# Patient Record
Sex: FEMALE | Race: WHITE | NOT HISPANIC OR LATINO | Employment: FULL TIME | URBAN - METROPOLITAN AREA
[De-identification: names, ages, dates, MRNs, and addresses within clinical notes are randomized per-mention and may not be internally consistent; named-entity substitution may affect disease eponyms.]

---

## 2023-04-10 LAB
ABO GROUP (TYPE) IN BLOOD: NORMAL
ANTIBODY SCREEN: NORMAL
GLUCOSE, 1 HR SCREEN, PREG: 147 MG/DL
HEMATOCRIT (%) IN BLOOD BY AUTOMATED COUNT: 33.5 % (ref 36–46)
HEMOGLOBIN (G/DL) IN BLOOD: 10.5 G/DL (ref 12–16)
HEPATITIS B VIRUS SURFACE AB (MIU/ML) IN SERUM: <3.1 MIU/ML
RH FACTOR: NORMAL

## 2023-04-14 LAB
GLUCOSE THREE HOUR: 117 MG/DL
GLUCOSE TWO HOUR: 130 MG/DL
GLUCOSE, FASTING: 79 MG/DL
GLUCOSE, ONE HOUR: 113 MG/DL
GTTCM: NORMAL

## 2023-06-24 LAB — GROUP B STREP SCREEN: ABNORMAL

## 2023-07-12 LAB
ABO GROUP (TYPE) IN BLOOD: NORMAL
ABO GROUP (TYPE) IN BLOOD: NORMAL
ANTIBODY SCREEN: NORMAL
ANTIBODY SCREEN: NORMAL
ERYTHROCYTE DISTRIBUTION WIDTH (RATIO) BY AUTOMATED COUNT: 14.7 % (ref 11.5–14.5)
ERYTHROCYTE MEAN CORPUSCULAR HEMOGLOBIN CONCENTRATION (G/DL) BY AUTOMATED: 34.7 G/DL (ref 32–36)
ERYTHROCYTE MEAN CORPUSCULAR VOLUME (FL) BY AUTOMATED COUNT: 88 FL (ref 80–100)
ERYTHROCYTES (10*6/UL) IN BLOOD BY AUTOMATED COUNT: 3.94 X10E12/L (ref 4–5.2)
HEMATOCRIT (%) IN BLOOD BY AUTOMATED COUNT: 34.6 % (ref 36–46)
HEMOGLOBIN (G/DL) IN BLOOD: 12 G/DL (ref 12–16)
LEUKOCYTES (10*3/UL) IN BLOOD BY AUTOMATED COUNT: 7.4 X10E9/L (ref 4.4–11.3)
PLATELETS (10*3/UL) IN BLOOD AUTOMATED COUNT: 135 X10E9/L (ref 150–450)
RH FACTOR: NORMAL
RH FACTOR: NORMAL

## 2023-09-29 PROBLEM — N97.9 FEMALE INFERTILITY: Status: ACTIVE | Noted: 2023-09-29

## 2023-09-29 PROBLEM — N91.2 AMENORRHEA: Status: ACTIVE | Noted: 2023-09-29

## 2023-09-29 PROBLEM — B97.7 HPV IN FEMALE: Status: ACTIVE | Noted: 2023-09-29

## 2023-09-29 PROBLEM — E66.01 CLASS 3 SEVERE OBESITY DUE TO EXCESS CALORIES WITH BODY MASS INDEX (BMI) OF 45.0 TO 49.9 IN ADULT (MULTI): Status: ACTIVE | Noted: 2023-09-29

## 2023-09-29 PROBLEM — R87.810 ASCUS WITH POSITIVE HIGH RISK HPV CERVICAL: Status: ACTIVE | Noted: 2023-09-29

## 2023-09-29 PROBLEM — Z67.91 BLOOD TYPE, RH NEGATIVE: Status: ACTIVE | Noted: 2023-09-29

## 2023-09-29 PROBLEM — E66.813 CLASS 3 SEVERE OBESITY DUE TO EXCESS CALORIES WITH BODY MASS INDEX (BMI) OF 45.0 TO 49.9 IN ADULT: Status: ACTIVE | Noted: 2023-09-29

## 2023-09-29 PROBLEM — Z87.59 HISTORY OF SPONTANEOUS ABORTION: Status: ACTIVE | Noted: 2023-09-29

## 2023-09-29 PROBLEM — R87.610 ASCUS WITH POSITIVE HIGH RISK HPV CERVICAL: Status: ACTIVE | Noted: 2023-09-29

## 2023-09-29 RX ORDER — SERTRALINE HYDROCHLORIDE 25 MG/1
TABLET, FILM COATED ORAL
COMMUNITY

## 2023-09-29 RX ORDER — LEVONORGESTREL 52 MG/1
1 INTRAUTERINE DEVICE INTRAUTERINE ONCE
COMMUNITY
Start: 2023-08-18 | End: 2030-08-19

## 2023-09-29 RX ORDER — SERTRALINE HYDROCHLORIDE 50 MG/1
1 TABLET, FILM COATED ORAL DAILY
COMMUNITY

## 2023-09-29 RX ORDER — PNV NO.95/FERROUS FUM/FOLIC AC 28MG-0.8MG
TABLET ORAL
COMMUNITY
Start: 2022-06-07

## 2023-09-29 RX ORDER — CLOMIPHENE CITRATE 50 MG/1
2 TABLET ORAL DAILY
COMMUNITY
Start: 2022-08-16

## 2023-10-02 ENCOUNTER — ROUTINE PRENATAL (OUTPATIENT)
Dept: OBSTETRICS AND GYNECOLOGY | Facility: CLINIC | Age: 38
End: 2023-10-02
Payer: COMMERCIAL

## 2023-10-02 DIAGNOSIS — Z01.419 WELL WOMAN EXAM WITH ROUTINE GYNECOLOGICAL EXAM: Primary | ICD-10-CM

## 2023-10-02 DIAGNOSIS — Z12.31 VISIT FOR SCREENING MAMMOGRAM: ICD-10-CM

## 2023-10-02 DIAGNOSIS — N76.0 BACTERIAL VAGINOSIS: ICD-10-CM

## 2023-10-02 DIAGNOSIS — B96.89 BACTERIAL VAGINOSIS: ICD-10-CM

## 2023-10-02 PROCEDURE — 88142 CYTOPATH C/V THIN LAYER: CPT

## 2023-10-02 PROCEDURE — 87624 HPV HI-RISK TYP POOLED RSLT: CPT

## 2023-10-02 PROCEDURE — 99396 PREV VISIT EST AGE 40-64: CPT | Performed by: OBSTETRICS & GYNECOLOGY

## 2023-10-02 RX ORDER — BUSPIRONE HYDROCHLORIDE 7.5 MG/1
7.5 TABLET ORAL 2 TIMES DAILY
COMMUNITY
Start: 2023-08-08 | End: 2023-11-06

## 2023-10-02 NOTE — PROGRESS NOTES
Ingrid Le is a 38 y.o. female who is here for a routine exam. Periods are  irregular since her IUD insertion last month , is bleeding daily. dysmenorrhea:none. Cyclic symptoms include none. No intermenstrual bleeding, spotting, or discharge.    Current contraception: IUD  Regular self breast exam: yes  History of abnormal mammogram: no  Family history of breast cancer: no  Menstrual History:  OB History    No obstetric history on file.        No LMP recorded.         Review of Systems  All Normal Review of Systems  Constitutional: no fever, no chills, no recent weight gain, no recent weight loss and no fatigue.    Gastrointestinal: no abdominal pain, no constipation, no nausea, no diarrhea and no vomiting.    Genitourinary: no dysuria, no urinary incontinence, no vaginal dryness, no vaginal itching, no dyspareunia, no pelvic pain, no dysmenorrhea, no sexual problems, no change in urinary frequency, no vaginal discharge, no unexplained vaginal bleeding and no lesion/sore.    Breasts: No masses.  No nipple discharge.  No redness    Objective   There were no vitals taken for this visit.    Physical Exam   Physical Exam  Constitutional: Alert and in no acute distress. Well developed, well nourished.   Head and Face: Head and face: Normal.    Eyes: Normal external exam - nonicteric sclera, extraocular movements intact (EOMI) and no ptosis.   Ears, Nose, Mouth, and Throat: External inspection of ears and nose: Normal.    Neck: No neck asymmetry. Supple. Thyroid not enlarged and there were no palpable thyroid nodules.   Chest: Breasts: Normal appearance, no nipple discharge and no skin changes. Palpation of breasts and axillae: No palpable mass and no axillary lymphadenopathy.   Abdomen: Soft nontender; no abdominal mass palpated. No organomegaly. No hernias.     Genitourinary:   External genitalia: Normal. No inguinal lymphadenopathy. Bartholin's Urethral and Skenes Glands: Normal. Urethra: Normal.    Bladder:  Normal on palpation.   Vagina: Normal. No discharge  Cervix: Normal.    Uterus: Normal.    Right Adnexa/parametria: Normal.  Left Adnexa/parametria: Normal.    Inspection of Perianal Area: Normal.     Musculoskeletal: No joint swelling seen, normal movements of all extremities.   Skin: Normal skin color and pigmentation, normal skin turgor, and no rash.   Neurologic: Non-focal. Grossly intact.   Psychiatric: Alert and oriented x 3. Affect normal to patient baseline. Mood: Appropriate.      Assessment/Plan   1: Annual exam  Pap with HPV testing completed.  Mammogram ordered    2: IUD surveillance.  IUD in place on exam.  Is good through August 2030.  We will check yearly at her annual    Thank you for your visit.  We will see you again next year for your annual exam or as needed

## 2023-10-20 ENCOUNTER — ANCILLARY PROCEDURE (OUTPATIENT)
Dept: RADIOLOGY | Facility: CLINIC | Age: 38
End: 2023-10-20
Payer: COMMERCIAL

## 2023-10-20 VITALS — HEIGHT: 66 IN | WEIGHT: 255.73 LBS | BODY MASS INDEX: 41.1 KG/M2

## 2023-10-20 DIAGNOSIS — Z12.31 VISIT FOR SCREENING MAMMOGRAM: ICD-10-CM

## 2023-10-20 PROCEDURE — 77067 SCR MAMMO BI INCL CAD: CPT | Mod: BILATERAL PROCEDURE | Performed by: RADIOLOGY

## 2023-10-20 PROCEDURE — 77067 SCR MAMMO BI INCL CAD: CPT | Mod: 50

## 2023-10-20 PROCEDURE — 77063 BREAST TOMOSYNTHESIS BI: CPT | Mod: BILATERAL PROCEDURE | Performed by: RADIOLOGY

## 2023-10-21 LAB
CYTOLOGY CMNT CVX/VAG CYTO-IMP: NORMAL
HPV HR GENOTYPES PNL CVX NAA+PROBE: NEGATIVE
HPV HR GENOTYPES PNL CVX NAA+PROBE: NEGATIVE
HPV16 DNA SPEC QL NAA+PROBE: NEGATIVE
HPV18 DNA SPEC QL NAA+PROBE: NEGATIVE
LAB AP HPV GENOTYPE QUESTION: YES
LAB AP HPV HR: NORMAL
LABORATORY COMMENT REPORT: NORMAL
LABORATORY COMMENT REPORT: NORMAL
PATH REPORT.TOTAL CANCER: NORMAL

## 2023-10-23 RX ORDER — METRONIDAZOLE 7.5 MG/G
GEL VAGINAL DAILY
Qty: 70 G | Refills: 0 | Status: SHIPPED | OUTPATIENT
Start: 2023-10-23 | End: 2023-10-28

## 2024-04-18 ENCOUNTER — HOSPITAL ENCOUNTER (OUTPATIENT)
Dept: RADIOLOGY | Facility: CLINIC | Age: 39
Discharge: HOME | End: 2024-04-18
Payer: COMMERCIAL

## 2024-04-18 DIAGNOSIS — M25.552 PAIN IN LEFT HIP: ICD-10-CM

## 2024-04-18 PROCEDURE — 73502 X-RAY EXAM HIP UNI 2-3 VIEWS: CPT | Mod: LEFT SIDE | Performed by: RADIOLOGY

## 2024-04-18 PROCEDURE — 73502 X-RAY EXAM HIP UNI 2-3 VIEWS: CPT | Mod: LT

## 2024-06-13 ENCOUNTER — HOSPITAL ENCOUNTER (EMERGENCY)
Facility: HOSPITAL | Age: 39
Discharge: HOME | End: 2024-06-13
Attending: EMERGENCY MEDICINE
Payer: COMMERCIAL

## 2024-06-13 VITALS
RESPIRATION RATE: 16 BRPM | DIASTOLIC BLOOD PRESSURE: 75 MMHG | WEIGHT: 240 LBS | SYSTOLIC BLOOD PRESSURE: 113 MMHG | TEMPERATURE: 97.3 F | BODY MASS INDEX: 38.57 KG/M2 | HEART RATE: 74 BPM | HEIGHT: 66 IN | OXYGEN SATURATION: 100 %
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
## 2024-06-13 LAB
ALBUMIN SERPL BCP-MCNC: 4.8 G/DL (ref 3.4–5)
ALP SERPL-CCNC: 89 U/L (ref 33–110)
ALT SERPL W P-5'-P-CCNC: 34 U/L (ref 7–45)
ANION GAP SERPL CALC-SCNC: 14 MMOL/L (ref 10–20)
AST SERPL W P-5'-P-CCNC: 20 U/L (ref 9–39)
B-HCG SERPL-ACNC: <2 MIU/ML
BASOPHILS # BLD AUTO: 0.04 X10*3/UL (ref 0–0.1)
BASOPHILS NFR BLD AUTO: 0.4 %
BILIRUB SERPL-MCNC: 0.7 MG/DL (ref 0–1.2)
BUN SERPL-MCNC: 19 MG/DL (ref 6–23)
CALCIUM SERPL-MCNC: 9.2 MG/DL (ref 8.6–10.3)
CHLORIDE SERPL-SCNC: 101 MMOL/L (ref 98–107)
CO2 SERPL-SCNC: 26 MMOL/L (ref 21–32)
CREAT SERPL-MCNC: 0.79 MG/DL (ref 0.5–1.05)
EGFRCR SERPLBLD CKD-EPI 2021: >90 ML/MIN/1.73M*2
EOSINOPHIL # BLD AUTO: 0.21 X10*3/UL (ref 0–0.7)
EOSINOPHIL NFR BLD AUTO: 2.3 %
ERYTHROCYTE [DISTWIDTH] IN BLOOD BY AUTOMATED COUNT: 13.4 % (ref 11.5–14.5)
GLUCOSE SERPL-MCNC: 97 MG/DL (ref 74–99)
HCT VFR BLD AUTO: 41 % (ref 36–46)
HGB BLD-MCNC: 14.1 G/DL (ref 12–16)
IMM GRANULOCYTES # BLD AUTO: 0.03 X10*3/UL (ref 0–0.7)
IMM GRANULOCYTES NFR BLD AUTO: 0.3 % (ref 0–0.9)
LYMPHOCYTES # BLD AUTO: 2.45 X10*3/UL (ref 1.2–4.8)
LYMPHOCYTES NFR BLD AUTO: 26.4 %
MCH RBC QN AUTO: 30.5 PG (ref 26–34)
MCHC RBC AUTO-ENTMCNC: 34.4 G/DL (ref 32–36)
MCV RBC AUTO: 89 FL (ref 80–100)
MONOCYTES # BLD AUTO: 0.47 X10*3/UL (ref 0.1–1)
MONOCYTES NFR BLD AUTO: 5.1 %
NEUTROPHILS # BLD AUTO: 6.09 X10*3/UL (ref 1.2–7.7)
NEUTROPHILS NFR BLD AUTO: 65.5 %
NRBC BLD-RTO: 0 /100 WBCS (ref 0–0)
PLATELET # BLD AUTO: 191 X10*3/UL (ref 150–450)
POTASSIUM SERPL-SCNC: 3.7 MMOL/L (ref 3.5–5.3)
PROT SERPL-MCNC: 8.3 G/DL (ref 6.4–8.2)
RBC # BLD AUTO: 4.62 X10*6/UL (ref 4–5.2)
SODIUM SERPL-SCNC: 137 MMOL/L (ref 136–145)
WBC # BLD AUTO: 9.3 X10*3/UL (ref 4.4–11.3)

## 2024-06-13 PROCEDURE — 90471 IMMUNIZATION ADMIN: CPT

## 2024-06-13 PROCEDURE — 87340 HEPATITIS B SURFACE AG IA: CPT | Mod: AHULAB

## 2024-06-13 PROCEDURE — 90715 TDAP VACCINE 7 YRS/> IM: CPT

## 2024-06-13 PROCEDURE — 99283 EMERGENCY DEPT VISIT LOW MDM: CPT | Mod: 25

## 2024-06-13 PROCEDURE — 87389 HIV-1 AG W/HIV-1&-2 AB AG IA: CPT | Mod: AHULAB

## 2024-06-13 PROCEDURE — 85025 COMPLETE CBC W/AUTO DIFF WBC: CPT

## 2024-06-13 PROCEDURE — 80053 COMPREHEN METABOLIC PANEL: CPT

## 2024-06-13 PROCEDURE — 2500000001 HC RX 250 WO HCPCS SELF ADMINISTERED DRUGS (ALT 637 FOR MEDICARE OP)

## 2024-06-13 PROCEDURE — 36415 COLL VENOUS BLD VENIPUNCTURE: CPT

## 2024-06-13 PROCEDURE — 84702 CHORIONIC GONADOTROPIN TEST: CPT

## 2024-06-13 PROCEDURE — 2500000004 HC RX 250 GENERAL PHARMACY W/ HCPCS (ALT 636 FOR OP/ED)

## 2024-06-13 PROCEDURE — 86706 HEP B SURFACE ANTIBODY: CPT | Mod: AHULAB

## 2024-06-13 PROCEDURE — 86803 HEPATITIS C AB TEST: CPT | Mod: AHULAB

## 2024-06-13 RX ORDER — EMTRICITABINE AND TENOFOVIR DISOPROXIL FUMARATE 200; 300 MG/1; MG/1
1 TABLET, FILM COATED ORAL ONCE
Status: COMPLETED | OUTPATIENT
Start: 2024-06-13 | End: 2024-06-13

## 2024-06-13 RX ORDER — EMTRICITABINE AND TENOFOVIR DISOPROXIL FUMARATE 200; 300 MG/1; MG/1
1 TABLET, FILM COATED ORAL DAILY
Qty: 27 TABLET | Refills: 0 | Status: SHIPPED | OUTPATIENT
Start: 2024-06-13 | End: 2024-07-10

## 2024-06-13 RX ADMIN — EMTRICITABINE AND TENOFOVIR DISOPROXIL FUMARATE 1 TABLET: 200; 300 TABLET, FILM COATED ORAL at 19:35

## 2024-06-13 RX ADMIN — DOLUTEGRAVIR SODIUM 50 MG: 50 TABLET, FILM COATED ORAL at 19:35

## 2024-06-13 RX ADMIN — TETANUS TOXOID, REDUCED DIPHTHERIA TOXOID AND ACELLULAR PERTUSSIS VACCINE, ADSORBED 0.5 ML: 5; 2.5; 8; 8; 2.5 SUSPENSION INTRAMUSCULAR at 17:56

## 2024-06-13 ASSESSMENT — COLUMBIA-SUICIDE SEVERITY RATING SCALE - C-SSRS
2. HAVE YOU ACTUALLY HAD ANY THOUGHTS OF KILLING YOURSELF?: NO
6. HAVE YOU EVER DONE ANYTHING, STARTED TO DO ANYTHING, OR PREPARED TO DO ANYTHING TO END YOUR LIFE?: NO
1. IN THE PAST MONTH, HAVE YOU WISHED YOU WERE DEAD OR WISHED YOU COULD GO TO SLEEP AND NOT WAKE UP?: NO

## 2024-06-13 NOTE — ED PROVIDER NOTES
HPI   Chief Complaint   Patient presents with    Body Fluid Exposure     Rt thumb needle prick at a [facility] clinic       38-year-old female presents the ED today with a chief concern of fingerstick injury.  Patient states that she stuck her right thumb with a needle.  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  She states that there was a needle pointing up and the needle poked her right thumb.  She states it did start bleeding a little bit.  She cleaned it off at work.  She denies any fever/chills, nausea/vomiting.  Denies numbness or tingling or weakness.  Did not sustain any other injuries.  Not sure when last tetanus vaccination was.  Up-to-date on remainder of immunizations.  Has no other symptoms or concerns this time.      History provided by:  Patient   used: No                        No data recorded                   Patient History   Past Medical History:   Diagnosis Date    14 weeks gestation of pregnancy ([facility]) 05/16/2022    14 weeks gestation of pregnancy    Acute candidiasis of vulva and vagina 02/25/2020    Yeast vaginitis    Acute vaginitis 11/14/2022    Bacterial vaginosis    Atypical squamous cells of undetermined significance on cytologic smear of cervix (ASC-US) 11/03/2022    ASCUS with positive high risk HPV cervical    Encounter for fertility testing 11/01/2021    Fertility testing    Encounter for fertility testing 10/18/2021    Fertility testing    Encounter for other general counseling and advice on procreation 10/18/2021    Encounter for preconception consultation    Encounter for other procreative management 11/29/2021    Encounter for fertility planning    Encounter for other screening for genetic and chromosomal anomalies 12/06/2022    Genetic screening    Encounter for other specified surgical aftercare 06/07/2022    Encounter for post surgical wound check    [facility]    Positive urine pregnancy test    [facility]    Positive blood pregnancy test    [facility]    Positive urine pregnancy test    Encounter for pregnancy test, result unknown 03/10/2022    Unconfirmed pregnancy    Encounter for removal of intrauterine contraceptive device 2021    Encounter for IUD removal    Encounter for routine checking of intrauterine contraceptive device 2021    Surveillance of (intrauterine) contraceptive device    Encounter for screening for infections with a predominantly sexual mode of transmission 2022    Screening for STD (sexually transmitted disease)    Encounter for screening for other suspected endocrine disorder 11/15/2022    Screening for thyroid disorder    Encounter for screening for other suspected endocrine disorder 10/18/2021    Screening for thyroid disorder    Incomplete spontaneous  without complication [facility] 01/10/2022    Incomplete     Localized adiposity 10/26/2021    Localized adiposity of abdomen    Low grade squamous intraepithelial lesion on cytologic smear of cervix ([facility]IL) 2020    Pap smear abnormality of cervix with LGSIL    Maternal care for (suspected) chromosomal abnormality in fetus, trisomy 13, not applicable or unspecified ([facility]) 2022    Trisomy 13 of fetus affecting management of pregnancy    Other specified health status 10/18/2021    Varicella vaccination status unknown    Other specified health status 10/18/2021    Rubella immune status not known    Other specified joint disorders, unspecified hip 2019    Femoral acetabular impingement    Other specified pregnancy related conditions, unspecified trimester ([facility]) 2022    Rh negative, maternal    Other sprain of left hip, subsequent encounter 11/15/2019    Acetabular labrum tear, left, subsequent encounter     Other sprain of right hip, subsequent encounter 2017    Acetabular labrum tear, right, subsequent encounter    Pain in right hip 2017    Right hip pain    Papillomavirus as the cause of diseases classified elsewhere 2022    HPV in female    Personal history of other diseases of the female genital tract 11/15/2022    History of amenorrhea    Personal history of other diseases of the female genital tract 2022    History of irregular menstrual cycles    Personal history of other diseases of the female genital tract 2022    History of irregular menstrual cycles    Personal history of other diseases of the female genital tract 2022    History of irregular menstrual cycles    Personal history of other diseases of the female genital tract 2022    History of irregular menstrual cycles    Personal history of other diseases of the female genital tract 2022    History of female infertility    Personal history of other mental and behavioral disorders 2017    History of depression    Postcoital and contact bleeding 2020    Postcoital bleeding    Pregnancy with inconclusive fetal viability, not applicable or unspecified ([facility])     Pregnancy with inconclusive fetal viability    Supervision of elderly multigravida, first trimester ([facility]) 2022    Supervision of elderly multigravida in first trimester (>=35 years old at time of delivery)     Past Surgical History:   Procedure Laterality Date    OTHER SURGICAL HISTORY  2022    Dilation and evacuation    OTHER SURGICAL HISTORY  10/18/2021    Tonsillectomy with adenoidectomy    OTHER SURGICAL HISTORY  02/10/2020    Sinus surgery    OTHER SURGICAL HISTORY  02/10/2020     section    OTHER SURGICAL HISTORY  02/10/2020    Hip arthroscopy    OTHER SURGICAL HISTORY  02/10/2020    Abdominoplasty     Family History   Problem Relation Name Age of Onset    Other (trisomy 18) Child       Social History      Tobacco Use    Smoking status: Never    Smokeless tobacco: Never   Substance Use Topics    Alcohol use: Never    Drug use: Never       Physical Exam   ED Triage Vitals [06/13/24 1717]   Temperature Heart Rate Respirations BP   36.3 °C (97.3 °F) 89 18 131/85      Pulse Ox Temp src Heart Rate Source Patient Position   99 % -- -- --      BP Location FiO2 (%)     -- --       Physical Exam  Constitutional: Vital signs per nursing notes.  Well developed, well nourished.  No acute distress.    Psychiatric: alert and oriented to person, place, and time; no abnormalities of mood or affect; memory intact  Eyes: PERRL; conjunctivae and lids normal  ENT: Ears normal externally; face symmetric. voice normal  Neck: neck supple, no meningismus; trachea midline without deviation  Respiratory: normal respiratory effort and excursion; no rales, rhonchi, or wheezes; equal air entry  Cardiovascular: RRR, 2+ pulses extremities   Neurological: normal speech; CN II-XII grossly intact, normal motor and sensory function  GI: no distention, soft, nontender  : Deferred  Musculoskeletal: normal gait and station; normal digits and nails; normal to palpation; normal strength/tone; neurovascular status intact.  Skin: Pinpoint prick to right thumb.  No active bleeding.  No surrounding erythema or streaking.    ED Course & MDM   ED Course as of 06/13/24 1926   Thu Jun 13, 2024 1910 Attempted to reach out to Dr. Campos infectious disease []   1926 CBC shows no evidence of leukocytosis or anemia.  CMP shows no acute or acute liver injury.  hCG normal.  HIV, hepatitis B surface antigen, base B surface antibody, hepatitis C antibody pending []      ED Course User Index  [] Jamie Payton PA-C         Diagnoses as of 06/13/24 1926   Needlestick injury accident with exposure to body fluid       Medical Decision Making  38-year-old female presents the ED today with a chief concern of fingerstick injury.  Vital signs reassuring.  Patient  overall appears well and is nontoxic-appearing.  Patient has full range of motion of the neck without any meningismus.  Was given tetanus in the ED. had extensive conversation with patient regarding starting postexposure prophylaxis medication.  Patient would like to be treated with medication at this time.  Labs drawn.  Offered cleaning wound in ED however patient declines and states that she already did this before she came.  Will have patient follow-up with infectious disease.  Discussed impression and findings with patient she feels comfortable returning home.  We discussed very strict return precautions include returning for any new or worsening signs or symptoms.  Patient is in agreement this plan.  She will follow-up with her PCP and infectious disease within 3 days.  Again discussed strict return precautions.  Started on Truvada and Tivicay per  needlestick exposure guidelines.  She was given a dose of these 2 medications in the ED as well.  Referral sent.    Attempted to reach out to Dr. Campos from infectious disease during patient's ED visit.  Unable to reach him.  He responded the next morning with number patient can call to schedule an appointment with their office.  I called patient and gave her this number.    Differential diagnosis: Needlestick injury, abrasion, strain, sprain, blood-borne pathogen    Disposition/treatment  1.  Needlestick injury    Shared decision-making was used patient feels comfortable returning home     Patient was advised to follow up with recommended provider in 1 day1 for another evaluation and exam. I advised patient/guardian to return or go to closest emergency room immediately if symptoms change, get worse, new symptoms develop prior to follow up. If there is no improvement in symptoms in the next 24 hours they are advised to return for further evaluation and exam. I also explained the plan and treatment course. Patient/guardian is in agreement with plan, treatment  course, and follow up and states verbally that they will comply.    Homegoing. I discussed the differential; results and discharge plan with the patient and/or family/friend/caregiver if present.  I emphasized the importance of follow-up with the physician I referred them to in the timeframe recommended.  I explained reasons for the patient to return to the Emergency Department. They agreed that if they feel their condition is worsening or if they have any other concern they should call 911 immediately for further assistance. I gave the patient an opportunity to ask all questions they had and answered all of them accordingly. They understand return precautions and discharge instructions. The patient and/or family/friend/caregiver expressed understanding verbally and that they would comply.        This note has been transcribed using voice recognition and may contain grammatical errors, misplaced words, incorrect words, incorrect phrases or other errors.        Procedure  Procedures     Jamie Payton PA-C  06/13/24 1927       Jamie Payton PA-C  06/14/24 0904       Jamie Payton PA-C  06/14/24 0904       Jamie Payton PA-C  06/14/24 0905

## 2024-06-13 NOTE — DISCHARGE INSTRUCTIONS
Please return to the ED immediately if you have any new or worsening signs or symptoms  Please follow-up with your primary care doctor within 3 days  Please follow-up with infectious disease within 3 days

## 2024-06-14 LAB
HBV SURFACE AB SER-ACNC: <3.1 MIU/ML
HBV SURFACE AG SERPL QL IA: NONREACTIVE
HCV AB SER QL: NONREACTIVE
HIV 1+2 AB+HIV1 P24 AG SERPL QL IA: NONREACTIVE

## 2024-07-10 ENCOUNTER — LAB (OUTPATIENT)
Dept: LAB | Facility: LAB | Age: 39
End: 2024-07-10
Payer: COMMERCIAL
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
## 2024-07-10 LAB
HBV CORE AB SER QL: NONREACTIVE
HCV AB SER QL: NONREACTIVE
HIV 1+2 AB+HIV1 P24 AG SERPL QL IA: NONREACTIVE

## 2024-07-10 PROCEDURE — 87389 HIV-1 AG W/HIV-1&-2 AB AG IA: CPT

## 2024-07-10 PROCEDURE — 86803 HEPATITIS C AB TEST: CPT

## 2024-07-10 PROCEDURE — 86704 HEP B CORE ANTIBODY TOTAL: CPT

## 2024-10-03 ENCOUNTER — APPOINTMENT (OUTPATIENT)
Dept: OBSTETRICS AND GYNECOLOGY | Facility: CLINIC | Age: 39
End: 2024-10-03
Payer: COMMERCIAL

## 2024-12-26 ENCOUNTER — HOSPITAL ENCOUNTER (OUTPATIENT)
Dept: RADIOLOGY | Facility: CLINIC | Age: 39
Discharge: HOME | End: 2024-12-26
Payer: COMMERCIAL

## 2024-12-26 ENCOUNTER — APPOINTMENT (OUTPATIENT)
Dept: OBSTETRICS AND GYNECOLOGY | Facility: CLINIC | Age: 39
End: 2024-12-26
Payer: COMMERCIAL

## 2024-12-26 VITALS
SYSTOLIC BLOOD PRESSURE: 102 MMHG | BODY MASS INDEX: 39.05 KG/M2 | HEIGHT: 66 IN | WEIGHT: 243 LBS | DIASTOLIC BLOOD PRESSURE: 64 MMHG

## 2024-12-26 VITALS — BODY MASS INDEX: 38.62 KG/M2 | HEIGHT: 66 IN | WEIGHT: 240.3 LBS

## 2024-12-26 DIAGNOSIS — N92.6 IRREGULAR MENSTRUAL CYCLE: ICD-10-CM

## 2024-12-26 DIAGNOSIS — Z97.5 BREAKTHROUGH BLEEDING ASSOCIATED WITH INTRAUTERINE DEVICE (IUD): Primary | ICD-10-CM

## 2024-12-26 DIAGNOSIS — N88.2 CERVICAL STENOSIS (UTERINE CERVIX): ICD-10-CM

## 2024-12-26 DIAGNOSIS — N92.1 BREAKTHROUGH BLEEDING ASSOCIATED WITH INTRAUTERINE DEVICE (IUD): Primary | ICD-10-CM

## 2024-12-26 DIAGNOSIS — Z12.31 SCREENING MAMMOGRAM FOR BREAST CANCER: ICD-10-CM

## 2024-12-26 DIAGNOSIS — T83.32XA INTRAUTERINE CONTRACEPTIVE DEVICE THREADS LOST, INITIAL ENCOUNTER: ICD-10-CM

## 2024-12-26 PROCEDURE — 99214 OFFICE O/P EST MOD 30 MIN: CPT | Performed by: OBSTETRICS & GYNECOLOGY

## 2024-12-26 PROCEDURE — 3008F BODY MASS INDEX DOCD: CPT | Performed by: OBSTETRICS & GYNECOLOGY

## 2024-12-26 PROCEDURE — 1036F TOBACCO NON-USER: CPT | Performed by: OBSTETRICS & GYNECOLOGY

## 2024-12-26 PROCEDURE — 76817 TRANSVAGINAL US OBSTETRIC: CPT | Performed by: OBSTETRICS & GYNECOLOGY

## 2024-12-26 PROCEDURE — 77067 SCR MAMMO BI INCL CAD: CPT

## 2024-12-26 RX ORDER — IBUPROFEN 600 MG/1
TABLET ORAL
Qty: 2 TABLET | Refills: 0 | Status: SHIPPED | OUTPATIENT
Start: 2024-12-26

## 2024-12-26 RX ORDER — MISOPROSTOL 200 UG/1
TABLET ORAL
Qty: 2 TABLET | Refills: 0 | Status: SHIPPED | OUTPATIENT
Start: 2024-12-26

## 2024-12-26 ASSESSMENT — PATIENT HEALTH QUESTIONNAIRE - PHQ9
SUM OF ALL RESPONSES TO PHQ9 QUESTIONS 1 AND 2: 0
1. LITTLE INTEREST OR PLEASURE IN DOING THINGS: NOT AT ALL
2. FEELING DOWN, DEPRESSED OR HOPELESS: NOT AT ALL

## 2024-12-26 NOTE — PROGRESS NOTES
"Subjective   Patient ID: Ingrid Le is a 39 y.o. female who presents for Menstrual Problem (Ongoing bleeding after IUD placement  and painful intercourse ).    Patient here for bleeding with her Mirena IUD  Mirena was inserted August 2023  Patient states her bleeding has been daily  In November she had 3 weeks of more heavy bleeding which is now spotting.  This happens every day  She is not interested in her IUD anymore but will keep it in until we decide a plan  Has failed the birth control pills in the past  Would like to have a hysterectomy at this point  Will need to evaluate the lining of the uterus.     IUD strings were not appreciated on exam today.  IUD was evaluated today and is in the proper location based on ultrasound    ROS  All Normal Review of Systems  Constitutional: no fever, no chills, no recent weight gain, no recent weight loss and no fatigue.    Gastrointestinal: no abdominal pain, no constipation, no nausea, no diarrhea and no vomiting.    Genitourinary: no dysuria, no urinary incontinence, no vaginal dryness, no vaginal itching, no dyspareunia, no pelvic pain, no dysmenorrhea, no sexual problems, no change in urinary frequency, no vaginal discharge, no unexplained vaginal bleeding and no lesion/sore.    Breasts: No masses.  No nipple discharge.  No redness    Objective   /64   Ht 1.676 m (5' 6\")   Wt 110 kg (243 lb)   LMP  (LMP Unknown) Comment: Ongoing  BMI 39.22 kg/m²    Physical Exam  General: No distress.  Alert and oriented  Abdomen: Soft, nontender, nondistended  External Genitalia:  no masses.  no erythema.  no discharge noted.  Vagina:  no masses.  no discharge noted.    Cervix: no masses.  IUD strings not visualized or palpated  Uterus:  normal size and contour.  no masses. palpated  Adnexa: R: no masses, non tender.    Adnexa: L: no masses.  non tender  Extremities: No swelling  Psych: No sadness.      Assessment/Plan   1) breakthrough bleeding and irregular bleeding " with her IUD.  Strings not visualized      Has been bleeding since her insertion  IUD was not visualized or palpated on exam.  Ultrasound today performed shows IUD within endometrium    Options reviewed with patient.  She is not interested in the pill as that has failed in the past.  Is having bleeding with her IUD  Is considering a hysterectomy  Will evaluate the lining of the uterus  Discussed with patient to take medication prior to the procedure.  Take 2 misoprostol and 600 mg of Motrin with food the night before the procedure and then another 600 mg of Motrin with food prior to the procedure    Thank you for your visit to our office today.

## 2025-01-10 ENCOUNTER — APPOINTMENT (OUTPATIENT)
Dept: OBSTETRICS AND GYNECOLOGY | Facility: CLINIC | Age: 40
End: 2025-01-10
Payer: COMMERCIAL

## 2025-01-22 ENCOUNTER — OFFICE VISIT (OUTPATIENT)
Dept: ORTHOPEDIC SURGERY | Facility: HOSPITAL | Age: 40
End: 2025-01-22
Payer: COMMERCIAL

## 2025-01-22 DIAGNOSIS — M16.12 ARTHRITIS OF LEFT HIP: Primary | ICD-10-CM

## 2025-01-22 PROCEDURE — 99213 OFFICE O/P EST LOW 20 MIN: CPT | Performed by: ORTHOPAEDIC SURGERY

## 2025-01-22 NOTE — PROGRESS NOTES
39 y.o. female here today for follow up on Left hip pain.  The patient underwent left hip arthroscopy in July 2019.  She had 50% articular cartilage loss at that time.  She reports worsening left hip pain over the last several months.  It is worse with activity.  She takes occasional over-the-counter anti-inflammatories for pain.    EXAM  Exam of the left hip shows pain with extremes of motion.  She has a positive FADIR test.  Neurovascularly intact.    IMAGING  X-rays reviewed today reveal no gross fracture or dislocation.  Early arthritic changes in the left hip.  There is some heterotopic ossification from her prior surgery.            ASSESSMENT/PLAN  Left hip osteoarthritis.    We discussed treatment options.  We recommended a short course of prescription anti-inflammatories.  If she gets good relief with this she can follow-up as needed.  If not, she will call the office to set up an ultrasound-guided corticosteroid injection.      Shun Michelle MD

## 2025-01-29 RX ORDER — MELOXICAM 15 MG/1
15 TABLET ORAL DAILY
Qty: 14 TABLET | Refills: 1 | Status: SHIPPED | OUTPATIENT
Start: 2025-01-29 | End: 2025-02-26